# Patient Record
Sex: FEMALE | Race: OTHER | NOT HISPANIC OR LATINO | Employment: PART TIME | URBAN - METROPOLITAN AREA
[De-identification: names, ages, dates, MRNs, and addresses within clinical notes are randomized per-mention and may not be internally consistent; named-entity substitution may affect disease eponyms.]

---

## 2019-09-22 ENCOUNTER — OFFICE VISIT (OUTPATIENT)
Dept: URGENT CARE | Facility: PHYSICIAN GROUP | Age: 64
End: 2019-09-22

## 2019-09-22 VITALS
WEIGHT: 155 LBS | SYSTOLIC BLOOD PRESSURE: 132 MMHG | HEART RATE: 97 BPM | DIASTOLIC BLOOD PRESSURE: 80 MMHG | HEIGHT: 62 IN | TEMPERATURE: 98.2 F | OXYGEN SATURATION: 94 % | RESPIRATION RATE: 16 BRPM | BODY MASS INDEX: 28.52 KG/M2

## 2019-09-22 DIAGNOSIS — M19.049 ARTHRITIS OF FINGER: ICD-10-CM

## 2019-09-22 DIAGNOSIS — G56.01 CARPAL TUNNEL SYNDROME OF RIGHT WRIST: ICD-10-CM

## 2019-09-22 PROCEDURE — 99204 OFFICE O/P NEW MOD 45 MIN: CPT | Performed by: PHYSICIAN ASSISTANT

## 2019-09-22 RX ORDER — LISINOPRIL AND HYDROCHLOROTHIAZIDE 20; 12.5 MG/1; MG/1
TABLET ORAL
Refills: 3 | COMMUNITY
Start: 2019-09-12

## 2019-09-22 SDOH — HEALTH STABILITY: MENTAL HEALTH: HOW OFTEN DO YOU HAVE A DRINK CONTAINING ALCOHOL?: MONTHLY OR LESS

## 2019-09-22 ASSESSMENT — ENCOUNTER SYMPTOMS
SORE THROAT: 0
HEADACHES: 0
VOMITING: 0
NAUSEA: 0
EYE DISCHARGE: 0
EYE REDNESS: 0
FEVER: 0
SHORTNESS OF BREATH: 0
COUGH: 0

## 2019-09-22 NOTE — PROGRESS NOTES
Subjective:      Carol Wolf is a 64 y.o. female who presents with Hand Problem (R hand [id number] )        HPI  This is a new problem.   The patient presents to clinic c/o intermittent tingling to her right thumb and index finger x 2 weeks. The patient states she woke up this morning with a burning sensation to her right wrist. The patient denies injury or trauma to her wrist and hand. No swelling. No bruising. No redness. No decreased ROM. The patient reports repetitive use of her right hand. The patient has been taking IBU for her symptoms. She has also been wearing a wrist splint at night.   The patient also presents to clinic c/o a deformity to her left index finger. The patient first noticed it yesterday. The patient is unsure of any recent injury or trauma to the finger. She reports no pain to the left index finger. No swelling. No bruising. No redness. No decreased ROM. No numbness, tingling, or weakness.     PMH: HTN  MEDS:   Current Outpatient Medications:   •  lisinopril-hydrochlorothiazide (PRINZIDE, ZESTORETIC) 20-12.5 MG per tablet, TAKE 1 TABLET BY MOUTH DAILY, Disp: , Rfl: 3  ALLERGIES: No Known Allergies  SURGHX: No past surgical history on file.  SOCHX:  reports that she has never smoked. She has never used smokeless tobacco. She reports that she drinks alcohol.  FH: Family history was reviewed, no pertinent findings to report        Review of Systems   Constitutional: Negative for fever.   HENT: Negative for congestion, ear pain and sore throat.    Eyes: Negative for discharge and redness.   Respiratory: Negative for cough and shortness of breath.    Cardiovascular: Negative for chest pain and leg swelling.   Gastrointestinal: Negative for nausea and vomiting.   Musculoskeletal: Positive for joint pain.   Skin: Negative for rash.   Neurological: Negative for headaches.   All other systems reviewed and are negative.         Objective:     /80   Pulse 97   Temp 36.8 °C (98.2 °F)  "(Temporal)   Resp 16   Ht 1.575 m (5' 2\")   Wt 70.3 kg (155 lb)   SpO2 94%   BMI 28.35 kg/m²      Physical Exam   Constitutional: She is oriented to person, place, and time. She appears well-developed and well-nourished. No distress.   HENT:   Head: Normocephalic and atraumatic.   Right Ear: External ear normal.   Left Ear: External ear normal.   Nose: Nose normal.   Eyes: Conjunctivae and EOM are normal.   Neck: Normal range of motion. Neck supple.   Cardiovascular: Normal rate.   Pulmonary/Chest: Effort normal.   Musculoskeletal:   Right Wrist/Hand:  No tenderness to palpation. No swelling. No ecchymosis. No erythema. No increased warmth.   ROM intact - the patient demonstrates full active ROM of right wrist and hand  Neurovascular intact   Gross sensation intact of right thumb and index finger   strength 5/5   Intrinsic Muscles intact  Tinels: positive  Phalen's: positive   Left Index Finger:  Bony deformity to the distal aspect of the left index finger involving the DIP joint. No tenderness to palpation. No swelling. No ecchymosis. No erythema. No increased warmth.  ROM intact - the patient demonstrates full active ROM; ROM against resistance intact  Neurovascular intact   Strength: 5/5  Intrinsic muscles intact   Neurological: She is alert and oriented to person, place, and time.   Skin: Skin is warm and dry.          Progress:  Provided the patient with a removable wrist spica splint for her carpal tunnel.      Assessment/Plan:     1. Carpal tunnel syndrome of right wrist  - REFERRAL TO ORTHOPEDICS    2. Arthritis of finger    The patient's presenting symptoms and physical exam are consistent with carpal tunnel syndrome of the right wrist.  Given the patient did not sustain any injury or trauma to her right wrist, and the presence of no tenderness or swelling to the right wrist on physical exam, is unlikely the patient's symptoms are due to an acute bony process.  On physical exam, the patient had " a positive Tinel's and Phalen's signs, indicating her symptoms are likely due to acute carpal tunnel.  Will refer the patient to orthopedics for further evaluation.  Will place the patient in a removable wrist spica splint for her current carpal tunnel symptoms. Advised the patient to wear the splint at night.  The patient also reports a deformity to the distal aspect of her left index finger.  On physical exam, a bony deformity to the distal aspect of the left index finger involving the DIP but joint was noted without tenderness, swelling, ecchymosis, or erythema. The patient demonstrated full active ROM, and did not have any increasing pain with ROM against resistance of the left index finger.  Based on the patient's presenting symptoms and physical exam findings, it is likely the patient's bony deformity is due to arthritis. The patient declined x-ray at this time.  Recommend the patient follow-up with her PCP.  Recommend OTC medications and supportive care for symptomatic management.  Discussed return precautions with the patient, and she verbalized understanding.    Differential diagnoses, supportive care, and indications for immediate follow-up discussed with patient.   Instructed to return to clinic or nearest emergency department for any change in condition, further concerns, or worsening of symptoms.    OTC NSAIDs for pain/discomfort  RICE  Wear wrist splint as needed  -- Recommend wearing the wrist splint at night  Referral to Orthopedics  Follow-up with PCP as needed  AVS printed  Return to clinic or go to the ED if symptoms worsen or fail to improve, or if the patient should develop worsening/increasing/persistent tingling to her right hand, pain/tenderness, swelling, redness or warmth to the affected area, decreased range of motion, decreased strength, fever/chills, and/or any concerning symptoms.    Discussed plan with the patient, and she agrees to the above.

## 2019-09-22 NOTE — PATIENT INSTRUCTIONS
Carpal Tunnel Syndrome  Carpal tunnel syndrome is a condition that causes pain in your hand and arm. The carpal tunnel is a narrow area located on the palm side of your wrist. Repeated wrist motion or certain diseases may cause swelling within the tunnel. This swelling pinches the main nerve in the wrist (median nerve).  What are the causes?  This condition may be caused by:  · Repeated wrist motions.  · Wrist injuries.  · Arthritis.  · A cyst or tumor in the carpal tunnel.  · Fluid buildup during pregnancy.  Sometimes the cause of this condition is not known.  What increases the risk?  This condition is more likely to develop in:  · People who have jobs that cause them to repeatedly move their wrists in the same motion, such as butchers and cashiers.  · Women.  · People with certain conditions, such as:  ¨ Diabetes.  ¨ Obesity.  ¨ An underactive thyroid (hypothyroidism).  ¨ Kidney failure.  What are the signs or symptoms?  Symptoms of this condition include:  · A tingling feeling in your fingers, especially in your thumb, index, and middle fingers.  · Tingling or numbness in your hand.  · An aching feeling in your entire arm, especially when your wrist and elbow are bent for long periods of time.  · Wrist pain that goes up your arm to your shoulder.  · Pain that goes down into your palm or fingers.  · A weak feeling in your hands. You may have trouble grabbing and holding items.  Your symptoms may feel worse during the night.  How is this diagnosed?  This condition is diagnosed with a medical history and physical exam. You may also have tests, including:  · An electromyogram (EMG). This test measures electrical signals sent by your nerves into the muscles.  · X-rays.  How is this treated?  Treatment for this condition includes:  · Lifestyle changes. It is important to stop doing or modify the activity that caused your condition.  · Physical or occupational therapy.  · Medicines for pain and inflammation. This may  include medicine that is injected into your wrist.  · A wrist splint.  · Surgery.  Follow these instructions at home:  If you have a splint:  · Wear it as told by your health care provider. Remove it only as told by your health care provider.  · Loosen the splint if your fingers become numb and tingle, or if they turn cold and blue.  · Keep the splint clean and dry.  General instructions  · Take over-the-counter and prescription medicines only as told by your health care provider.  · Rest your wrist from any activity that may be causing your pain. If your condition is work related, talk to your employer about changes that can be made, such as getting a wrist pad to use while typing.  · If directed, apply ice to the painful area:  ¨ Put ice in a plastic bag.  ¨ Place a towel between your skin and the bag.  ¨ Leave the ice on for 20 minutes, 2-3 times per day.  · Keep all follow-up visits as told by your health care provider. This is important.  · Do any exercises as told by your health care provider, physical therapist, or occupational therapist.  Contact a health care provider if:  · You have new symptoms.  · Your pain is not controlled with medicines.  · Your symptoms get worse.  This information is not intended to replace advice given to you by your health care provider. Make sure you discuss any questions you have with your health care provider.  Document Released: 12/15/2001 Document Revised: 04/27/2017 Document Reviewed: 05/04/2016  [employer] Patient Education © 2017 [employer]

## 2021-10-04 ENCOUNTER — HOSPITAL ENCOUNTER (OUTPATIENT)
Dept: RADIOLOGY | Facility: MEDICAL CENTER | Age: 66
End: 2021-10-04
Attending: PHYSICIAN ASSISTANT
Payer: COMMERCIAL

## 2021-10-04 DIAGNOSIS — S42.291A OTHER DISPLACED FRACTURE OF UPPER END OF RIGHT HUMERUS, INITIAL ENCOUNTER FOR CLOSED FRACTURE: ICD-10-CM

## 2021-10-04 PROCEDURE — 73200 CT UPPER EXTREMITY W/O DYE: CPT | Mod: RT

## 2023-07-19 ENCOUNTER — HOSPITAL ENCOUNTER (OUTPATIENT)
Dept: CARDIOLOGY | Facility: MEDICAL CENTER | Age: 68
End: 2023-07-19
Attending: ANESTHESIOLOGY
Payer: COMMERCIAL

## 2023-07-19 DIAGNOSIS — Z01.810 PREOP CARDIOVASCULAR EXAM: Primary | ICD-10-CM

## 2023-07-19 LAB — EKG IMPRESSION: NORMAL

## 2023-07-19 PROCEDURE — 93005 ELECTROCARDIOGRAM TRACING: CPT

## 2023-07-19 PROCEDURE — 93010 ELECTROCARDIOGRAM REPORT: CPT | Performed by: INTERNAL MEDICINE
